# Patient Record
Sex: FEMALE | Race: WHITE | URBAN - METROPOLITAN AREA
[De-identification: names, ages, dates, MRNs, and addresses within clinical notes are randomized per-mention and may not be internally consistent; named-entity substitution may affect disease eponyms.]

---

## 2017-04-04 ENCOUNTER — EMERGENCY (EMERGENCY)
Facility: HOSPITAL | Age: 37
LOS: 1 days | Discharge: HOME | End: 2017-04-04
Attending: EMERGENCY MEDICINE | Admitting: EMERGENCY MEDICINE
Payer: COMMERCIAL

## 2017-04-04 VITALS
HEART RATE: 64 BPM | HEIGHT: 63 IN | RESPIRATION RATE: 20 BRPM | DIASTOLIC BLOOD PRESSURE: 81 MMHG | OXYGEN SATURATION: 100 % | SYSTOLIC BLOOD PRESSURE: 132 MMHG | TEMPERATURE: 98 F

## 2017-04-04 VITALS
SYSTOLIC BLOOD PRESSURE: 98 MMHG | RESPIRATION RATE: 20 BRPM | DIASTOLIC BLOOD PRESSURE: 62 MMHG | TEMPERATURE: 98 F | OXYGEN SATURATION: 98 % | HEART RATE: 92 BPM

## 2017-04-04 DIAGNOSIS — M54.6 PAIN IN THORACIC SPINE: ICD-10-CM

## 2017-04-04 DIAGNOSIS — F43.10 POST-TRAUMATIC STRESS DISORDER, UNSPECIFIED: ICD-10-CM

## 2017-04-04 DIAGNOSIS — F32.9 MAJOR DEPRESSIVE DISORDER, SINGLE EPISODE, UNSPECIFIED: ICD-10-CM

## 2017-04-04 DIAGNOSIS — R45.851 SUICIDAL IDEATIONS: ICD-10-CM

## 2017-04-04 PROCEDURE — 99284 EMERGENCY DEPT VISIT MOD MDM: CPT | Mod: 25

## 2017-04-04 PROCEDURE — 71046 X-RAY EXAM CHEST 2 VIEWS: CPT

## 2017-04-04 PROCEDURE — 71020: CPT | Mod: 26

## 2017-04-04 PROCEDURE — 99284 EMERGENCY DEPT VISIT MOD MDM: CPT

## 2017-04-04 NOTE — ED ADULT NURSE NOTE - OBJECTIVE STATEMENT
37 y/o s/w/f who presents self to ED for c/o low back pain for over 2 years now she verbalized s/i, but no plan or hx of suicide attempt, +hx of OCD, takes zoloft 50mg po bid, On asssessment, pt. denies any prior psychiatric admissions, no hx of drugs/ etoh.In, addition, pt.denies any s/hi, no a/v hallucinations or delusions of any kind.

## 2017-04-04 NOTE — ED ADULT NURSE NOTE - PMH
Depression    GERD (gastroesophageal reflux disease)    Iron deficiency anemia    MVP (mitral valve prolapse)

## 2017-04-04 NOTE — ED BEHAVIORAL HEALTH ASSESSMENT NOTE - HPI (INCLUDE ILLNESS QUALITY, SEVERITY, DURATION, TIMING, CONTEXT, MODIFYING FACTORS, ASSOCIATED SIGNS AND SYMPTOMS)
Pt is a 36 year old woman, domiciled with parents, no dependents, employed full time as , hx of MDD, no prior hospitalizations or suicide attempts, no hx of substance abuse, legal problems or aggression, BIB self for back pain.    However as pt was crying and appeared sad, psych was consulted.  Pt states that she has been going through a lot of stress.  For example her mom is getting a risky surgery.  She is financially supporting her parents and her brother.  She just broke up with her boyfriend today, which is the main trigger for her sadness.  However, she is not suicidal and does not feel hopeless. SHe states her family and nieces and nephews as reasons to live.  She is functioning fine, performing ADLs and performing at work and continues to work full time.  She sleeps well at night and has a good appetite.  She denies psychotic or manic symptoms, denies substance use.  She feels comfortable going home and following up with her psychiatrist outpatient.

## 2017-04-04 NOTE — ED BEHAVIORAL HEALTH ASSESSMENT NOTE - SUICIDE PROTECTIVE FACTORS
Engaged in work or school/Identifies reasons for living/Responsibility to family and others/Supportive social network or family/Future oriented

## 2017-04-04 NOTE — ED ADULT TRIAGE NOTE - CHIEF COMPLAINT QUOTE
back pain.  Pt denies any fall or injury.  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  Pain present x 1 year.  Today pain is worse.   Unknown amount of klonipin, robaxin, and xanax taken.  Pt states she is suicidal.

## 2017-04-04 NOTE — ED BEHAVIORAL HEALTH ASSESSMENT NOTE - SUMMARY
Pt is a 36 year old woman, domiciled with parents, no dependents, employed full time as , hx of MDD, no prior hospitalizations or suicide attempts, no hx of substance abuse, legal problems or aggression, BIB self for back pain.  Pt denies any suicidality.  Although sad, pt states she has been able to function and mood has been stable until today her boyfriend broke up with her.  However, she denies feeling hopeless, lists her family as reasons she wants to live and is future oriented about her job and has her brother and parents as financially dependent on her, which is a responsibility she takes seriously.  Furthermore, she has no hx of suicide attempts.  Pt does not require psychiatric hospitalization and pt feels safe regarding plan to be discharged home and to follow up with her outpatient psychiatrist.

## 2017-04-04 NOTE — ED BEHAVIORAL HEALTH ASSESSMENT NOTE - RISK ASSESSMENT
Pt has risk factors of psychosocial stressors, depression.  Pt has protective factors of social support, dependents (parents and brother are financially dependent), future orientation, denies suicidality, no substance use, employed.

## 2017-04-04 NOTE — ED PROVIDER NOTE - OBJECTIVE STATEMENT
36 y.o. female coming in with worsening mid thoracic back pain that she has had for over a year.  Normally responsive to NSAID which she took today with robaxin with some relief.  No saddle anesthesia, no numbness, no weakness, no problems using the bathroom, no dysuria, no frequency.  While in triage she bacme upset, began crying and yelling that she was going to kill herself.  No suicide attempts in the past.  History of PTSD according to the pt and follow up with Dr Iqbal at [facility].  No drug use today, no HI or hallucinations.

## 2017-04-04 NOTE — ED PROVIDER NOTE - MEDICAL DECISION MAKING DETAILS
Morteza: Patient yelling in moment expressing thoughts of killing herself. no plan. also c/o upper back pain.  will get psych eval, xray, reassess.

## 2017-07-07 ENCOUNTER — EMERGENCY (EMERGENCY)
Facility: HOSPITAL | Age: 37
LOS: 1 days | Discharge: HOME | End: 2017-07-07
Attending: EMERGENCY MEDICINE | Admitting: EMERGENCY MEDICINE
Payer: COMMERCIAL

## 2017-07-07 VITALS
DIASTOLIC BLOOD PRESSURE: 80 MMHG | RESPIRATION RATE: 18 BRPM | TEMPERATURE: 98 F | HEART RATE: 81 BPM | SYSTOLIC BLOOD PRESSURE: 120 MMHG | OXYGEN SATURATION: 100 %

## 2017-07-07 VITALS
OXYGEN SATURATION: 100 % | TEMPERATURE: 99 F | HEART RATE: 74 BPM | DIASTOLIC BLOOD PRESSURE: 71 MMHG | SYSTOLIC BLOOD PRESSURE: 110 MMHG | RESPIRATION RATE: 16 BRPM

## 2017-07-07 LAB
ALBUMIN SERPL ELPH-MCNC: 4.2 G/DL — SIGNIFICANT CHANGE UP (ref 3.3–5)
ALP SERPL-CCNC: 38 U/L — LOW (ref 40–120)
ALT FLD-CCNC: 10 U/L RC — SIGNIFICANT CHANGE UP (ref 10–45)
ANION GAP SERPL CALC-SCNC: 13 MMOL/L — SIGNIFICANT CHANGE UP (ref 5–17)
APPEARANCE UR: CLEAR — SIGNIFICANT CHANGE UP
AST SERPL-CCNC: 19 U/L — SIGNIFICANT CHANGE UP (ref 10–40)
BILIRUB SERPL-MCNC: 0.3 MG/DL — SIGNIFICANT CHANGE UP (ref 0.2–1.2)
BILIRUB UR-MCNC: ABNORMAL
BUN SERPL-MCNC: 9 MG/DL — SIGNIFICANT CHANGE UP (ref 7–23)
CALCIUM SERPL-MCNC: 9 MG/DL — SIGNIFICANT CHANGE UP (ref 8.4–10.5)
CHLORIDE SERPL-SCNC: 104 MMOL/L — SIGNIFICANT CHANGE UP (ref 96–108)
CO2 SERPL-SCNC: 23 MMOL/L — SIGNIFICANT CHANGE UP (ref 22–31)
COLOR SPEC: YELLOW — SIGNIFICANT CHANGE UP
CREAT SERPL-MCNC: 0.56 MG/DL — SIGNIFICANT CHANGE UP (ref 0.5–1.3)
DIFF PNL FLD: NEGATIVE — SIGNIFICANT CHANGE UP
GAS PNL BLDV: SIGNIFICANT CHANGE UP
GLUCOSE SERPL-MCNC: 78 MG/DL — SIGNIFICANT CHANGE UP (ref 70–99)
GLUCOSE UR QL: NEGATIVE — SIGNIFICANT CHANGE UP
HCG SERPL-ACNC: <2 MIU/ML — SIGNIFICANT CHANGE UP
HCT VFR BLD CALC: 26.9 % — LOW (ref 34.5–45)
HGB BLD-MCNC: 8.1 G/DL — LOW (ref 11.5–15.5)
KETONES UR-MCNC: ABNORMAL
LEUKOCYTE ESTERASE UR-ACNC: NEGATIVE — SIGNIFICANT CHANGE UP
MCHC RBC-ENTMCNC: 21.4 PG — LOW (ref 27–34)
MCHC RBC-ENTMCNC: 30.1 GM/DL — LOW (ref 32–36)
MCV RBC AUTO: 71 FL — LOW (ref 80–100)
NITRITE UR-MCNC: NEGATIVE — SIGNIFICANT CHANGE UP
PH UR: 6 — SIGNIFICANT CHANGE UP (ref 5–8)
PLATELET # BLD AUTO: 223 K/UL — SIGNIFICANT CHANGE UP (ref 150–400)
POTASSIUM SERPL-MCNC: 3.7 MMOL/L — SIGNIFICANT CHANGE UP (ref 3.5–5.3)
POTASSIUM SERPL-SCNC: 3.7 MMOL/L — SIGNIFICANT CHANGE UP (ref 3.5–5.3)
PROT SERPL-MCNC: 6.9 G/DL — SIGNIFICANT CHANGE UP (ref 6–8.3)
PROT UR-MCNC: 30 MG/DL
RBC # BLD: 3.79 M/UL — LOW (ref 3.8–5.2)
RBC # FLD: 17.5 % — HIGH (ref 10.3–14.5)
SODIUM SERPL-SCNC: 140 MMOL/L — SIGNIFICANT CHANGE UP (ref 135–145)
SP GR SPEC: >1.03 — HIGH (ref 1.01–1.02)
UROBILINOGEN FLD QL: 1
WBC # BLD: 5.8 K/UL — SIGNIFICANT CHANGE UP (ref 3.8–10.5)
WBC # FLD AUTO: 5.8 K/UL — SIGNIFICANT CHANGE UP (ref 3.8–10.5)

## 2017-07-07 PROCEDURE — 76830 TRANSVAGINAL US NON-OB: CPT

## 2017-07-07 PROCEDURE — 85014 HEMATOCRIT: CPT

## 2017-07-07 PROCEDURE — 76856 US EXAM PELVIC COMPLETE: CPT

## 2017-07-07 PROCEDURE — 84295 ASSAY OF SERUM SODIUM: CPT

## 2017-07-07 PROCEDURE — 83605 ASSAY OF LACTIC ACID: CPT

## 2017-07-07 PROCEDURE — 84702 CHORIONIC GONADOTROPIN TEST: CPT

## 2017-07-07 PROCEDURE — 93975 VASCULAR STUDY: CPT | Mod: 26

## 2017-07-07 PROCEDURE — 99285 EMERGENCY DEPT VISIT HI MDM: CPT

## 2017-07-07 PROCEDURE — 85027 COMPLETE CBC AUTOMATED: CPT

## 2017-07-07 PROCEDURE — 76856 US EXAM PELVIC COMPLETE: CPT | Mod: 26,59

## 2017-07-07 PROCEDURE — 81001 URINALYSIS AUTO W/SCOPE: CPT

## 2017-07-07 PROCEDURE — 76830 TRANSVAGINAL US NON-OB: CPT | Mod: 26

## 2017-07-07 PROCEDURE — 80053 COMPREHEN METABOLIC PANEL: CPT

## 2017-07-07 PROCEDURE — 84132 ASSAY OF SERUM POTASSIUM: CPT

## 2017-07-07 PROCEDURE — 93975 VASCULAR STUDY: CPT

## 2017-07-07 PROCEDURE — 82435 ASSAY OF BLOOD CHLORIDE: CPT

## 2017-07-07 PROCEDURE — 82330 ASSAY OF CALCIUM: CPT

## 2017-07-07 PROCEDURE — 82947 ASSAY GLUCOSE BLOOD QUANT: CPT

## 2017-07-07 PROCEDURE — 99284 EMERGENCY DEPT VISIT MOD MDM: CPT | Mod: 25

## 2017-07-07 PROCEDURE — 82803 BLOOD GASES ANY COMBINATION: CPT

## 2017-07-07 NOTE — ED ADULT NURSE NOTE - OBJECTIVE STATEMENT
36 yr old female with mother with c/o lower abd pain since last night, extensive hx fibroids, ruptured uterine cysts, +currently day 2 of menstrual cycle, +normal blood flow, dec appettite, (denies nausea/vomiting/fever), at present appears pale, vitals stable, afebrile, skin wdi 36 yr old female with mother with c/o lower abd pain since last night, extensive hx fibroids, ruptured ovarian cysts, +currently day 2 of menstrual cycle, +normal blood flow, dec appettite, (denies nausea/vomiting/fever), at present appears pale, vitals stable, afebrile, skin wdi, +abd pain feels similar to prior ruptured cyst

## 2017-07-07 NOTE — ED ADULT NURSE NOTE - ED STAT RN HANDOFF DETAILS
Report received from Lauren FAM. Patient A&Ox3, neuro intact, VSS. Reports pain but denies desire for pain medication. Awaiting US. Family at bedside. Call light placed next to patient. Safety Provided. Will continue to monitor.

## 2017-07-07 NOTE — ED PROVIDER NOTE - ATTENDING CONTRIBUTION TO CARE
I have seen and evaluated this patient with the resident.   I agree with the findings  unless other wise stated.  I have made appropriate changes in documentations where needed, After my face to face bedside evaluation, I am further  noting: Small complex free fluid in the right adnexa, may be seen in the setting of ruptured corpus luteum or hemorrhagic cyst. Will discharge pt home with iron supplement and to follow up with GYN.

## 2017-07-07 NOTE — ED PROVIDER NOTE - MEDICAL DECISION MAKING DETAILS
pt with lower abdominal pain, hx of ovarian cyst rupture, uterine fibroid. CBC shows microcytic anema. will check transvag US.  Bulky fibroid uterus with dominant 6 cm posterior body intramural fibroid.    Small complex free fluid in the right adnexa, may be seen in the setting of ruptured corpus luteum or hemorrhagic cyst. Will discharge pt home to follow up with GYN. pt with lower abdominal pain, hx of ovarian cyst rupture, uterine fibroid. CBC shows microcytic anemia. will check transvag US.  Bulky fibroid uterus with dominant 6 cm posterior body intramural fibroid.    Small complex free fluid in the right adnexa, may be seen in the setting of ruptured corpus luteum or hemorrhagic cyst. Will discharge pt home with iron supplement and to follow up with GYN. pt with lower abdominal pain, hx of ovarian cyst rupture, uterine fibroid. CBC shows microcytic anemia. will check transvag US. findings suggest large uterine fibroid causing pain pain control outpatient f/u -- Roque  Bulky fibroid uterus with dominant 6 cm posterior body intramural fibroid.    Small complex free fluid in the right adnexa, may be seen in the setting of ruptured corpus luteum or hemorrhagic cyst. Will discharge pt home with iron supplement and to follow up with GYN.

## 2017-07-07 NOTE — ED PROVIDER NOTE - OBJECTIVE STATEMENT
35 yo f pw abd pain. Pain started last night in the lower abd. Pain is constant, 8/10, sharp. some radiation to the back. She had hx of ruptured cyst.  a year ago. and hx of uterine fibroid. No diarrhea but endorses constipation. Normal BM yesterday. sexually active. uses protection.  no vaginal discharge, no nausea, vomiting. No recent changes in diet, no recent travel.  Currently on period but different from cramp.     Med: Zoloft and Klonopin.

## 2017-09-07 ENCOUNTER — EMERGENCY (EMERGENCY)
Facility: HOSPITAL | Age: 37
LOS: 1 days | Discharge: HOME | End: 2017-09-07
Attending: EMERGENCY MEDICINE | Admitting: EMERGENCY MEDICINE
Payer: COMMERCIAL

## 2017-09-07 VITALS
RESPIRATION RATE: 18 BRPM | TEMPERATURE: 98 F | DIASTOLIC BLOOD PRESSURE: 75 MMHG | SYSTOLIC BLOOD PRESSURE: 110 MMHG | OXYGEN SATURATION: 98 % | HEART RATE: 77 BPM

## 2017-09-07 LAB
ALBUMIN SERPL ELPH-MCNC: 4.4 G/DL — SIGNIFICANT CHANGE UP (ref 3.3–5)
ALP SERPL-CCNC: 44 U/L — SIGNIFICANT CHANGE UP (ref 40–120)
ALT FLD-CCNC: 11 U/L RC — SIGNIFICANT CHANGE UP (ref 10–45)
ANION GAP SERPL CALC-SCNC: 12 MMOL/L — SIGNIFICANT CHANGE UP (ref 5–17)
ANISOCYTOSIS BLD QL: SIGNIFICANT CHANGE UP
AST SERPL-CCNC: 15 U/L — SIGNIFICANT CHANGE UP (ref 10–40)
BASOPHILS # BLD AUTO: 0 K/UL — SIGNIFICANT CHANGE UP (ref 0–0.2)
BASOPHILS NFR BLD AUTO: 0.8 % — SIGNIFICANT CHANGE UP (ref 0–2)
BILIRUB SERPL-MCNC: 0.2 MG/DL — SIGNIFICANT CHANGE UP (ref 0.2–1.2)
BLD GP AB SCN SERPL QL: NEGATIVE — SIGNIFICANT CHANGE UP
BUN SERPL-MCNC: 8 MG/DL — SIGNIFICANT CHANGE UP (ref 7–23)
CALCIUM SERPL-MCNC: 9.4 MG/DL — SIGNIFICANT CHANGE UP (ref 8.4–10.5)
CHLORIDE SERPL-SCNC: 104 MMOL/L — SIGNIFICANT CHANGE UP (ref 96–108)
CO2 SERPL-SCNC: 26 MMOL/L — SIGNIFICANT CHANGE UP (ref 22–31)
CREAT SERPL-MCNC: 0.65 MG/DL — SIGNIFICANT CHANGE UP (ref 0.5–1.3)
ELLIPTOCYTES BLD QL SMEAR: SLIGHT — SIGNIFICANT CHANGE UP
EOSINOPHIL # BLD AUTO: 0.2 K/UL — SIGNIFICANT CHANGE UP (ref 0–0.5)
EOSINOPHIL NFR BLD AUTO: 3.6 % — SIGNIFICANT CHANGE UP (ref 0–6)
GLUCOSE SERPL-MCNC: 137 MG/DL — HIGH (ref 70–99)
HCT VFR BLD CALC: 24.4 % — LOW (ref 34.5–45)
HGB BLD-MCNC: 7.2 G/DL — LOW (ref 11.5–15.5)
HYPOCHROMIA BLD QL: SIGNIFICANT CHANGE UP
INR BLD: 1.09 RATIO — SIGNIFICANT CHANGE UP (ref 0.88–1.16)
LYMPHOCYTES # BLD AUTO: 1.8 K/UL — SIGNIFICANT CHANGE UP (ref 1–3.3)
LYMPHOCYTES # BLD AUTO: 31.9 % — SIGNIFICANT CHANGE UP (ref 13–44)
MACROCYTES BLD QL: SLIGHT — SIGNIFICANT CHANGE UP
MCHC RBC-ENTMCNC: 19.4 PG — LOW (ref 27–34)
MCHC RBC-ENTMCNC: 29.4 GM/DL — LOW (ref 32–36)
MCV RBC AUTO: 66 FL — LOW (ref 80–100)
MICROCYTES BLD QL: SIGNIFICANT CHANGE UP
MONOCYTES # BLD AUTO: 0.5 K/UL — SIGNIFICANT CHANGE UP (ref 0–0.9)
MONOCYTES NFR BLD AUTO: 9.4 % — SIGNIFICANT CHANGE UP (ref 2–14)
NEUTROPHILS # BLD AUTO: 3.1 K/UL — SIGNIFICANT CHANGE UP (ref 1.8–7.4)
NEUTROPHILS NFR BLD AUTO: 54.2 % — SIGNIFICANT CHANGE UP (ref 43–77)
OVALOCYTES BLD QL SMEAR: SLIGHT — SIGNIFICANT CHANGE UP
PLAT MORPH BLD: NORMAL — SIGNIFICANT CHANGE UP
PLATELET # BLD AUTO: 220 K/UL — SIGNIFICANT CHANGE UP (ref 150–400)
POIKILOCYTOSIS BLD QL AUTO: SLIGHT — SIGNIFICANT CHANGE UP
POTASSIUM SERPL-MCNC: 3.7 MMOL/L — SIGNIFICANT CHANGE UP (ref 3.5–5.3)
POTASSIUM SERPL-SCNC: 3.7 MMOL/L — SIGNIFICANT CHANGE UP (ref 3.5–5.3)
PROT SERPL-MCNC: 7.2 G/DL — SIGNIFICANT CHANGE UP (ref 6–8.3)
PROTHROM AB SERPL-ACNC: 11.8 SEC — SIGNIFICANT CHANGE UP (ref 9.8–12.7)
RBC # BLD: 3.7 M/UL — LOW (ref 3.8–5.2)
RBC # FLD: 18.8 % — HIGH (ref 10.3–14.5)
RBC BLD AUTO: ABNORMAL
RH IG SCN BLD-IMP: POSITIVE — SIGNIFICANT CHANGE UP
SODIUM SERPL-SCNC: 142 MMOL/L — SIGNIFICANT CHANGE UP (ref 135–145)
WBC # BLD: 5.8 K/UL — SIGNIFICANT CHANGE UP (ref 3.8–10.5)
WBC # FLD AUTO: 5.8 K/UL — SIGNIFICANT CHANGE UP (ref 3.8–10.5)

## 2017-09-07 PROCEDURE — 99220: CPT

## 2017-09-07 RX ORDER — ACETAMINOPHEN 500 MG
650 TABLET ORAL ONCE
Qty: 0 | Refills: 0 | Status: COMPLETED | OUTPATIENT
Start: 2017-09-07 | End: 2017-09-07

## 2017-09-07 RX ADMIN — Medication 650 MILLIGRAM(S): at 21:17

## 2017-09-07 NOTE — ED ADULT NURSE NOTE - NS ED NURSE DC INFO COMPLEXITY
Simple: Patient demonstrates quick and easy understanding/Returned Demonstration/Patient asked questions/Verbalized Understanding

## 2017-09-07 NOTE — ED CDU PROVIDER NOTE - PLAN OF CARE
1. Follow up with your doctor/ GYN for an outpt workup of your anemia.   2. Take copy of your results to your next appointment.  3. Rest. Keep self well hydrated. Dark green leafy vegetables. Iron supplements with glass of orange juice.   4. Return if weakness, chest pain, short of breath, dark stools, dizzy or any other concerns.

## 2017-09-07 NOTE — ED PROVIDER NOTE - NS ED ROS FT
CONSTITUTIONAL: no fevers  HEENT: no dysphagia  CV: no chest pain  RESP: no SOB  GI: no nausea/vomiting  : no dysuria  DERM: no rash  MSK: no back pain  NEURO: no LOC  ENDO: no diabetes

## 2017-09-07 NOTE — ED CDU PROVIDER NOTE - OBJECTIVE STATEMENT
36yof pmhx menorrhagia, iron deficiency anemia, last outpatient Hgb 7.2 1 week ago, here with generalized malaise, just finished her menses 2d ago, OB will be starting her on q 3 month birth control.  no LOC, no fevers, no dysuria. No chest pain or sob. Never had transfusion before. LMP 2 days ago. No active bleeding.

## 2017-09-07 NOTE — ED PROVIDER NOTE - PHYSICAL EXAMINATION
GEN: calm, cooperative, ENT: mucous membranes moist, HEAD: NCAT, CV: RRR, RESP: no respiratory distress, ABD: no abdominal TTP, MSK: moves all extremities, NEURO: awake, alert, oriented, PSYCH: affect normal

## 2017-09-07 NOTE — ED PROVIDER NOTE - OBJECTIVE STATEMENT
37 yo female, history of menorrhagia, iron deficiency anemia, last outpatient Hgb 7.2 1 week ago, here with generalized malaise, just finished her menses 2d ago, OB will be starting her on q 3 month birth control.  no LOC, no fevers, no dysuria.

## 2017-09-07 NOTE — ED CDU PROVIDER NOTE - PROGRESS NOTE DETAILS
Pt sleeping. NAD. No complaints. VSS. Continue to monitor. getting the 2nd unit of prbc -PA Sarah Zuniga Pt sleeping. NAD. No complaints. VSS. Continue to monitor. pending repeat cbc -PA Sarah Zuniga Patient seen at bedside in NAD.  VSS.  Patient resting comfortably without complaints. Repeat Hgb 9.6.  Patient instructed to continue iron supplementation and stool softeners.  Will follow up with her OBGYN next week. -Narinder Schrader PA-C ED attending Dr Kris Moore note:  Patient re-evaluated and doing well.  No acute issues at  this time.  Lab and radiology tests reviewed with patient and/or family.  Patient stable for discharge.  I have personally performed a face to face diagnostic evaluation on this patient.  I have reviewed the ACP note and agree with the history, exam, and plan of care, except as noted.  History and Exam by me show improvement of sts, received 2 units of prbc with repeat h/h 9.6/32, discussed with ob/gyn sent rx for ocp, fe and outpt follow up.

## 2017-09-07 NOTE — ED ADULT NURSE NOTE - OBJECTIVE STATEMENT
35 yo presents to the ED from urgent center. A&Ox4 c/o weakness for one week. pt reports that she is currently menstruating. pt reports ovarian cysts, pt reports severe pain during menstruation, was seen at ED last week for pain control and amount of blood loss, 10-15 saturated pads per day. pt states she went to urgent care center and they were concerned about low H&H. pt reports "always having low H&H". pt denies SOB, CP, dizziness, HA, N/V, fever. pt reports chills. pt denies previous blood transfusions. VSS. ambulating without difficulty. plan of care discussed. safety and comfort measures maintained.

## 2017-09-08 VITALS
SYSTOLIC BLOOD PRESSURE: 118 MMHG | HEART RATE: 74 BPM | TEMPERATURE: 99 F | RESPIRATION RATE: 18 BRPM | DIASTOLIC BLOOD PRESSURE: 66 MMHG | OXYGEN SATURATION: 100 %

## 2017-09-08 LAB
BASOPHILS # BLD AUTO: 0.1 K/UL — SIGNIFICANT CHANGE UP (ref 0–0.2)
BASOPHILS NFR BLD AUTO: 0.6 % — SIGNIFICANT CHANGE UP (ref 0–2)
EOSINOPHIL # BLD AUTO: 0.3 K/UL — SIGNIFICANT CHANGE UP (ref 0–0.5)
EOSINOPHIL NFR BLD AUTO: 3.3 % — SIGNIFICANT CHANGE UP (ref 0–6)
HCT VFR BLD CALC: 31.9 % — LOW (ref 34.5–45)
HGB BLD-MCNC: 9.6 G/DL — LOW (ref 11.5–15.5)
LYMPHOCYTES # BLD AUTO: 2 K/UL — SIGNIFICANT CHANGE UP (ref 1–3.3)
LYMPHOCYTES # BLD AUTO: 21.8 % — SIGNIFICANT CHANGE UP (ref 13–44)
MCHC RBC-ENTMCNC: 21.4 PG — LOW (ref 27–34)
MCHC RBC-ENTMCNC: 30.1 GM/DL — LOW (ref 32–36)
MCV RBC AUTO: 71.2 FL — LOW (ref 80–100)
MONOCYTES # BLD AUTO: 0.9 K/UL — SIGNIFICANT CHANGE UP (ref 0–0.9)
MONOCYTES NFR BLD AUTO: 9.9 % — SIGNIFICANT CHANGE UP (ref 2–14)
NEUTROPHILS # BLD AUTO: 5.8 K/UL — SIGNIFICANT CHANGE UP (ref 1.8–7.4)
NEUTROPHILS NFR BLD AUTO: 64.5 % — SIGNIFICANT CHANGE UP (ref 43–77)
PLAT MORPH BLD: NORMAL — SIGNIFICANT CHANGE UP
PLATELET # BLD AUTO: 279 K/UL — SIGNIFICANT CHANGE UP (ref 150–400)
RBC # BLD: 4.48 M/UL — SIGNIFICANT CHANGE UP (ref 3.8–5.2)
RBC # FLD: 20.6 % — HIGH (ref 10.3–14.5)
RBC BLD AUTO: SIGNIFICANT CHANGE UP
WBC # BLD: 9 K/UL — SIGNIFICANT CHANGE UP (ref 3.8–10.5)
WBC # FLD AUTO: 9 K/UL — SIGNIFICANT CHANGE UP (ref 3.8–10.5)

## 2017-09-08 PROCEDURE — 85027 COMPLETE CBC AUTOMATED: CPT

## 2017-09-08 PROCEDURE — 36430 TRANSFUSION BLD/BLD COMPNT: CPT

## 2017-09-08 PROCEDURE — 85610 PROTHROMBIN TIME: CPT

## 2017-09-08 PROCEDURE — 86901 BLOOD TYPING SEROLOGIC RH(D): CPT

## 2017-09-08 PROCEDURE — G0378: CPT

## 2017-09-08 PROCEDURE — 99217: CPT

## 2017-09-08 PROCEDURE — 99285 EMERGENCY DEPT VISIT HI MDM: CPT | Mod: 25

## 2017-09-08 PROCEDURE — 86900 BLOOD TYPING SEROLOGIC ABO: CPT

## 2017-09-08 PROCEDURE — P9016: CPT

## 2017-09-08 PROCEDURE — 80053 COMPREHEN METABOLIC PANEL: CPT

## 2017-09-08 PROCEDURE — 86850 RBC ANTIBODY SCREEN: CPT

## 2017-09-08 PROCEDURE — 86923 COMPATIBILITY TEST ELECTRIC: CPT

## 2017-09-08 NOTE — ED ADULT NURSE REASSESSMENT NOTE - NS ED NURSE REASSESS COMMENT FT1
#1 PRBC started as per order with 2RNs verifying the pt and blood product, consent in chart, pt is educated for signs and symptoms of adverse reaction.
Pt received from SARWAT Giang. Pt oriented to CDU & plan of care was discussed. Pt received receiving 2U PRBC. Pt aware of S&S of blood transfusion reaction and will notify RN or PA of symptoms. Pt states she is not having any dizziness, SOB, but feels slightly tired. Safety & comfort measures maintained. Call bell in reach. Will continue to monitor.
Pt states she feels better S/P blood transfusion  for possible discharge  & awaiting for CDU Consult

## 2017-11-10 NOTE — ED ADULT NURSE NOTE - CINV DISCH EXIT CARE INSTR PROVIDE
Please sign/send pending orders and notify pt iron may cause/worsen constipation, advise taking daily miralax (1 capful daily in an sugar-free beverage) while on iron   -repeat CBC in 2 weeks time to make sure he is making progress  -if unable to tolerate iron twice a day due to GI upset, can take once daily  -iron is best absorbed on an empty stomach when taken with Vitamin C tablet.   -can also supplement iron intake by cooking meals on cast iron    Thank you    yes

## 2018-01-08 ENCOUNTER — RESULT REVIEW (OUTPATIENT)
Age: 38
End: 2018-01-08

## 2019-06-21 ENCOUNTER — APPOINTMENT (OUTPATIENT)
Dept: ENDOCRINOLOGY | Facility: CLINIC | Age: 39
End: 2019-06-21

## 2019-10-25 NOTE — ED CLERICAL - DIVISION
Chief Complaint   Patient presents with   • Sinus Problem     Patient states runny nose and sinus itching . patient states green drainage , HA, pressure, congestion and chest tightning since Yesterday. Works at [employer] and been on lock down URI and pneumonia. Patient states she had flu shot already.         HISTORY OF PRESENT ILLNESS:   The patient is a 46 year old female who presents with complaints of cold, congestion, postnasal drip some chest tightening this started since yesterday denies any nausea vomiting chest pain shortness of breath wheezing is wondering if this has anything to do with pneumonia.  Patient denies any fever, chills, nausea, vomiting, shortness of breath, wheezing.  Has not taken anything over-the-counter.  Patient requesting a work note    PAST MEDICAL HISTORY, PAST SURGICAL HISTORY, SOCIAL HISTORY, MEDICATIONS, AND ALLERGIES:  Reviewed per electronic chart.    REVIEW OF SYSTEMS:  Constitutional:  Denies fever or chills   Eyes:  Denies change in visual acuity   Respiratory:  Per History of Present Illness   Cardiovascular:  Denies chest pain or edema   Gastrointestinal:  Denies abdominal pain, nausea, vomiting, bloody stools or diarrhea   Integument:  Denies rash       PHYSICAL EXAM:  Vitals:   Vitals:    10/25/19 0834   BP: (!) 151/110   Pulse: 76   Temp: 96.7 °F (35.9 °C)   TempSrc: Tympanic   SpO2: 98%   Weight: 104.7 kg   Height: 5' 3\" (1.6 m)   PainSc: 5-6      SpO2 Readings from Last 1 Encounters:   10/25/19 98%     Constitutional:  No acute distress, nontoxic appearance.  HENT: Normocephalic, atraumatic. Bilateral external ears normal. Oropharynx moist. No oral exudates. Nose inflamed Mucous membranes with swollen turbinates. Serous fluid present behind the bilateral tympanic membranes. No inflammation.  Eyes:  PERRLA (pupils equal, round, and reactive to light and accommodation), EOMI (extraocular movements are intact). Conjunctivae normal. No discharge.  Neck:  Normal range of motion. No  tenderness. Supple. No lymphadenopathy. No stridor.  Cardiovascular:  Normal heart rate. Normal rhythm. No murmurs. No rubs. No gallops.  Pulmonary/Chest:  Normal breath sounds. No respiratory distress. No wheezing. No chest tenderness .  Abdomen:  Bowel sounds normal. Soft. No tenderness. No masses. No pulsatile masses.    Lab/Radiology:  Orders Placed This Encounter   • cetirizine (ZYRTEC) 10 MG tablet   • Benzonatate (TESSALON PO)   • amoxicillin (AMOXIL) 875 MG tablet   • promethazine-dextromethorphan (PROMETHAZINE-DM) 6.25-15 MG/5ML syrup       ASSESSMENT:   Encounter Diagnoses   Name Primary?   • Acute URI Yes   • Bilateral acute serous otitis media, recurrence not specified        PLAN:  Symptomatic care reviewed amoxicillin for 10 days promethazine DM for cough I tried to explain to the patient this is all viral, patient insists on antibiotics.    Follow up with primary if no improvement of symptoms or worsening. Patient acknowledged understanding of the instructions.     [facility]...

## 2020-07-09 NOTE — ED BEHAVIORAL HEALTH ASSESSMENT NOTE - NS ED BHA MSE GENERAL APPEARANCE
+ Glucerna Shake 3 x daily =660 calories, 30 grams protein/consistent carbohydrate (evening snack)/dysphagia 2, mechanical soft, thin liquids/low sodium
Well developed

## 2025-03-24 ENCOUNTER — NON-APPOINTMENT (OUTPATIENT)
Age: 45
End: 2025-03-24

## 2025-05-09 ENCOUNTER — EMERGENCY (EMERGENCY)
Facility: HOSPITAL | Age: 45
LOS: 1 days | End: 2025-05-09
Attending: STUDENT IN AN ORGANIZED HEALTH CARE EDUCATION/TRAINING PROGRAM
Payer: SELF-PAY

## 2025-05-09 VITALS
RESPIRATION RATE: 14 BRPM | TEMPERATURE: 98 F | DIASTOLIC BLOOD PRESSURE: 71 MMHG | HEART RATE: 85 BPM | SYSTOLIC BLOOD PRESSURE: 106 MMHG | OXYGEN SATURATION: 97 %

## 2025-05-09 VITALS
TEMPERATURE: 98 F | DIASTOLIC BLOOD PRESSURE: 78 MMHG | HEART RATE: 108 BPM | WEIGHT: 139.99 LBS | RESPIRATION RATE: 18 BRPM | HEIGHT: 63 IN | SYSTOLIC BLOOD PRESSURE: 120 MMHG | OXYGEN SATURATION: 97 %

## 2025-05-09 LAB
ALBUMIN SERPL ELPH-MCNC: 4.4 G/DL — SIGNIFICANT CHANGE UP (ref 3.3–5)
ALP SERPL-CCNC: 59 U/L — SIGNIFICANT CHANGE UP (ref 40–120)
ALT FLD-CCNC: 22 U/L — SIGNIFICANT CHANGE UP (ref 10–45)
ANION GAP SERPL CALC-SCNC: 13 MMOL/L — SIGNIFICANT CHANGE UP (ref 5–17)
APTT BLD: 29.8 SEC — SIGNIFICANT CHANGE UP (ref 26.1–36.8)
AST SERPL-CCNC: 49 U/L — HIGH (ref 10–40)
BASOPHILS # BLD AUTO: 0.06 K/UL — SIGNIFICANT CHANGE UP (ref 0–0.2)
BASOPHILS NFR BLD AUTO: 0.8 % — SIGNIFICANT CHANGE UP (ref 0–2)
BILIRUB SERPL-MCNC: 0.2 MG/DL — SIGNIFICANT CHANGE UP (ref 0.2–1.2)
BUN SERPL-MCNC: 15 MG/DL — SIGNIFICANT CHANGE UP (ref 7–23)
CALCIUM SERPL-MCNC: 9.3 MG/DL — SIGNIFICANT CHANGE UP (ref 8.4–10.5)
CHLORIDE SERPL-SCNC: 102 MMOL/L — SIGNIFICANT CHANGE UP (ref 96–108)
CO2 SERPL-SCNC: 20 MMOL/L — LOW (ref 22–31)
CREAT SERPL-MCNC: 0.58 MG/DL — SIGNIFICANT CHANGE UP (ref 0.5–1.3)
EGFR: 115 ML/MIN/1.73M2 — SIGNIFICANT CHANGE UP
EGFR: 115 ML/MIN/1.73M2 — SIGNIFICANT CHANGE UP
EOSINOPHIL # BLD AUTO: 0.14 K/UL — SIGNIFICANT CHANGE UP (ref 0–0.5)
EOSINOPHIL NFR BLD AUTO: 1.9 % — SIGNIFICANT CHANGE UP (ref 0–6)
GLUCOSE SERPL-MCNC: 113 MG/DL — HIGH (ref 70–99)
HCT VFR BLD CALC: 40.4 % — SIGNIFICANT CHANGE UP (ref 34.5–45)
HGB BLD-MCNC: 13.2 G/DL — SIGNIFICANT CHANGE UP (ref 11.5–15.5)
IMM GRANULOCYTES NFR BLD AUTO: 0.3 % — SIGNIFICANT CHANGE UP (ref 0–0.9)
INR BLD: 0.92 RATIO — SIGNIFICANT CHANGE UP (ref 0.85–1.16)
LYMPHOCYTES # BLD AUTO: 2.88 K/UL — SIGNIFICANT CHANGE UP (ref 1–3.3)
LYMPHOCYTES # BLD AUTO: 39 % — SIGNIFICANT CHANGE UP (ref 13–44)
MCHC RBC-ENTMCNC: 29.7 PG — SIGNIFICANT CHANGE UP (ref 27–34)
MCHC RBC-ENTMCNC: 32.7 G/DL — SIGNIFICANT CHANGE UP (ref 32–36)
MCV RBC AUTO: 91 FL — SIGNIFICANT CHANGE UP (ref 80–100)
MONOCYTES # BLD AUTO: 0.72 K/UL — SIGNIFICANT CHANGE UP (ref 0–0.9)
MONOCYTES NFR BLD AUTO: 9.7 % — SIGNIFICANT CHANGE UP (ref 2–14)
NEUTROPHILS # BLD AUTO: 3.57 K/UL — SIGNIFICANT CHANGE UP (ref 1.8–7.4)
NEUTROPHILS NFR BLD AUTO: 48.3 % — SIGNIFICANT CHANGE UP (ref 43–77)
NRBC BLD AUTO-RTO: 0 /100 WBCS — SIGNIFICANT CHANGE UP (ref 0–0)
PLATELET # BLD AUTO: 225 K/UL — SIGNIFICANT CHANGE UP (ref 150–400)
POTASSIUM SERPL-MCNC: 5.1 MMOL/L — SIGNIFICANT CHANGE UP (ref 3.5–5.3)
POTASSIUM SERPL-SCNC: 5.1 MMOL/L — SIGNIFICANT CHANGE UP (ref 3.5–5.3)
PROT SERPL-MCNC: 7.6 G/DL — SIGNIFICANT CHANGE UP (ref 6–8.3)
PROTHROM AB SERPL-ACNC: 10.5 SEC — SIGNIFICANT CHANGE UP (ref 9.9–13.4)
RBC # BLD: 4.44 M/UL — SIGNIFICANT CHANGE UP (ref 3.8–5.2)
RBC # FLD: 12.1 % — SIGNIFICANT CHANGE UP (ref 10.3–14.5)
SODIUM SERPL-SCNC: 135 MMOL/L — SIGNIFICANT CHANGE UP (ref 135–145)
TROPONIN T, HIGH SENSITIVITY RESULT: <6 NG/L — SIGNIFICANT CHANGE UP (ref 0–51)
WBC # BLD: 7.39 K/UL — SIGNIFICANT CHANGE UP (ref 3.8–10.5)
WBC # FLD AUTO: 7.39 K/UL — SIGNIFICANT CHANGE UP (ref 3.8–10.5)

## 2025-05-09 PROCEDURE — 70498 CT ANGIOGRAPHY NECK: CPT | Mod: 26

## 2025-05-09 PROCEDURE — 80053 COMPREHEN METABOLIC PANEL: CPT

## 2025-05-09 PROCEDURE — 99053 MED SERV 10PM-8AM 24 HR FAC: CPT

## 2025-05-09 PROCEDURE — 70496 CT ANGIOGRAPHY HEAD: CPT

## 2025-05-09 PROCEDURE — 70450 CT HEAD/BRAIN W/O DYE: CPT

## 2025-05-09 PROCEDURE — 36000 PLACE NEEDLE IN VEIN: CPT | Mod: XU

## 2025-05-09 PROCEDURE — 99285 EMERGENCY DEPT VISIT HI MDM: CPT | Mod: 25

## 2025-05-09 PROCEDURE — 93010 ELECTROCARDIOGRAM REPORT: CPT

## 2025-05-09 PROCEDURE — 93005 ELECTROCARDIOGRAM TRACING: CPT

## 2025-05-09 PROCEDURE — 85610 PROTHROMBIN TIME: CPT

## 2025-05-09 PROCEDURE — 85025 COMPLETE CBC W/AUTO DIFF WBC: CPT

## 2025-05-09 PROCEDURE — 84484 ASSAY OF TROPONIN QUANT: CPT

## 2025-05-09 PROCEDURE — 70498 CT ANGIOGRAPHY NECK: CPT

## 2025-05-09 PROCEDURE — 70496 CT ANGIOGRAPHY HEAD: CPT | Mod: 26

## 2025-05-09 PROCEDURE — 82962 GLUCOSE BLOOD TEST: CPT

## 2025-05-09 PROCEDURE — 99285 EMERGENCY DEPT VISIT HI MDM: CPT

## 2025-05-09 PROCEDURE — 70450 CT HEAD/BRAIN W/O DYE: CPT | Mod: 26

## 2025-05-09 PROCEDURE — 85730 THROMBOPLASTIN TIME PARTIAL: CPT

## 2025-05-09 RX ORDER — SODIUM CHLORIDE 9 G/1000ML
1000 INJECTION, SOLUTION INTRAVENOUS ONCE
Refills: 0 | Status: COMPLETED | OUTPATIENT
Start: 2025-05-09 | End: 2025-05-09

## 2025-05-09 RX ADMIN — SODIUM CHLORIDE 1000 MILLILITER(S): 9 INJECTION, SOLUTION INTRAVENOUS at 01:07

## 2025-05-09 NOTE — ED PROVIDER NOTE - ATTENDING CONTRIBUTION TO CARE
44F called as a code stroke from waiting room, LKN 5/8/2025 11p for right facial droop/tightness w/o headache, weakness, vision changes, chest pain, shortness of breath, dizziness, nausea, vomiting, speech changes. Had botox 2 weeks prior. No prior hx of stroke.     Hemodynamically stable. NAD. NIHSS 1, Patient aaox4 to person, place, time, event. CNs intact w/ symm face except for right labial flattening, PERRL 3mm, EOMI w/o nystagmus, normal phonation. 5/5 str all 4 extrem w/ intact sensation to touch. Well coordinated. No drift. Steady gait. 2+ distal pulses, equal b/l. , well hydrated, RRR, no audible cardiac murmurs. clear lungs, no inc work of breathing. benign abdomen, - barcenas, no peritoneal signs, no cva ttp. no visible rashes nor deformities, no signs of jaundice, fluid overload, nor anemia. symm calves, no edema.    Right facial droop, NIHSS 1, no other neuro deficits, likely botox AE mediated, unlikely stroke, other central neuro process. Labs, CTA h/n, CTH, f/u neuro recs from code stroke activation from triage. Summary of presentation, physical exam findings, plan, expected turn around time for diagnostics discussed with patient. Agrees.

## 2025-05-09 NOTE — ED ADULT TRIAGE NOTE - HEART RATE (BEATS/MIN)
"Subjective:   Patient ID:  Tasha Cornejo is a 45 y.o. female who presents for evaluation of Palpitations (Echo and EKG done before)    PROBLEM LIST:  Breast cancer, right 10/23 (completed TH, now on trastuzumab-anns)    HPI: Cardio-oncology consultation  Referred today by Dr. Mcbride to establish care with Cardio-Oncology. She recently finished treatment with Taxol and Herceptin and is now receiving Kanjinti. Her EF and strain have been stable. Some regional wall motion abnormalities were reported on her last echo. She reports having palpitations about 6 weeks ago at work which were "intense." Since that time she has intermittent fluttering in her chest which occurs on a daily basis. She was given a prescription for propanolol by Oncology but hasn't taken it.    ECG 22-MAR-2024 09:51:26: Personally reviewed by me shows NSR 74 bpm    ECHO 3/24:  Summary  Show Result Comparison     Left Ventricle: The left ventricle is normal in size. Ventricular mass is normal. Normal wall thickness. Regional wall motion abnormalities present. See diagram for wall motion findings. There is low normal systolic function with a visually estimated ejection fraction of 50 - 55%. Ejection fraction by visual approximation is 55%. Biplane (2D) method of discs ejection fraction is 55%.GLS - 18.4. There is normal diastolic function.    Right Ventricle: Normal right ventricular cavity size. Wall thickness is normal. Right ventricle wall motion  is normal. Systolic function is normal.    Aortic Valve: There is mild aortic valve sclerosis.    IVC/SVC: Normal venous pressure at 3 mmHg.    Echo 11/23:  Summary  Show Result Comparison     Left Ventricle: The left ventricle is normal in size. Normal wall thickness. Normal wall motion. There is normal systolic function with a visually estimated ejection fraction of 60 - 65%. There is normal diastolic function.  Normal GLS at -20.6%.    Right Ventricle: Normal right ventricular cavity size. Wall " thickness is normal. Right ventricle wall motion  is normal. Systolic function is normal.    IVC/SVC: Normal venous pressure at 3 mmHg.    Past Medical History:   Diagnosis Date    Acid reflux     Chalazion     Hx of psychiatric care     Malignant neoplasm of lower-inner quadrant of right breast of female, estrogen receptor positive 2023    Therapy        Past Surgical History:   Procedure Laterality Date    BILATERAL MASTECTOMY Bilateral 2023    Procedure: MASTECTOMY, BILATERAL;  Surgeon: Tasha Mcleod MD;  Location: [facility];  Service: General;  Laterality: Bilateral;  3.5 HOURS / EMAIL SENT  @ 1:03 LK    COLONOSCOPY N/A 2023    Procedure: COLONOSCOPY;  Surgeon: Fabio Rice MD;  Location: [facility] ENDO (4TH FLR);  Service: Endoscopy;  Laterality: N/A;  pt confirmed earlier time  EB    ESOPHAGOGASTRODUODENOSCOPY N/A 2023    Procedure: EGD (ESOPHAGOGASTRODUODENOSCOPY);  Surgeon: Fabio Rice MD;  Location: [facility] (4TH FLR);  Service: Endoscopy;  Laterality: N/A;  Procedure Timin-4 weeks    referred by Dr. Rice/Prep instructions handed to patient and to portal.  Patient called did not answer- DB    ESOPHAGOGASTRODUODENOSCOPY N/A 10/18/2023    Procedure: EGD (ESOPHAGOGASTRODUODENOSCOPY);  Surgeon: Fabio Rice MD;  Location: [facility] (4TH FLR);  Service: Endoscopy;  Laterality: N/A;  ref Ray  timeframe 5-12 weeks   Dr. Rice patient  portal instruction and given to patient jm  10/11-precall complete-MS    INJECTION FOR SENTINEL NODE IDENTIFICATION Right 2023    Procedure: INJECTION, FOR SENTINEL NODE IDENTIFICATION;  Surgeon: Tasha Mcleod MD;  Location: [facility];  Service: General;  Laterality: Right;    INSERTION OF BREAST TISSUE EXPANDER Bilateral 2023    Procedure: INSERTION, TISSUE EXPANDER, BREAST;  Surgeon: Allen Damian MD;  Location: [facility];  Service: Plastics;  Laterality: Bilateral;  2 HOURS    INSERTION, CATHETER, TUNNELED Left 2023    Procedure:  INSERTION, CATHETER, TUNNELED;  Surgeon: Tasha Mcleod MD;  Location: [facility] OR;  Service: General;  Laterality: Left;    SENTINEL LYMPH NODE BIOPSY Right 12/14/2023    Procedure: BIOPSY, LYMPH NODE, SENTINEL;  Surgeon: Tasha Mcleod MD;  Location: [facility] OR;  Service: General;  Laterality: Right;       Social History     Socioeconomic History    Marital status:      Spouse name: Oseas   Occupational History    Occupation:    Tobacco Use    Smoking status: Never    Smokeless tobacco: Never   Substance and Sexual Activity    Alcohol use: Yes     Comment: rarely    Drug use: No    Sexual activity: Not Currently     Social Determinants of Health     Financial Resource Strain: Low Risk  (3/4/2024)    Overall Financial Resource Strain (CARDIA)     Difficulty of Paying Living Expenses: Not hard at all   Food Insecurity: No Food Insecurity (3/4/2024)    Hunger Vital Sign     Worried About Running Out of Food in the Last Year: Never true     Ran Out of Food in the Last Year: Never true   Transportation Needs: No Transportation Needs (3/4/2024)    PRAPARE - Transportation     Lack of Transportation (Medical): No     Lack of Transportation (Non-Medical): No   Physical Activity: Sufficiently Active (3/4/2024)    Exercise Vital Sign     Days of Exercise per Week: 4 days     Minutes of Exercise per Session: 60 min   Stress: Stress Concern Present (3/4/2024)    [employer] [place] [employer] - Occupational Stress Questionnaire     Feeling of Stress : To some extent   Social Connections: Unknown (3/4/2024)    Social Connection and Isolation Panel [NHANES]     Frequency of Communication with Friends and Family: Once a week     Frequency of Social Gatherings with Friends and Family: Never     Active Member of Clubs or Organizations: No     Attends Club or Organization Meetings: Patient declined     Marital Status:    Housing Stability: Low Risk  (3/4/2024)    Housing Stability Vital Sign      Unable to Pay for Housing in the Last Year: No     Number of Places Lived in the Last Year: 1     Unstable Housing in the Last Year: No       Family History   Problem Relation Name Age of Onset    Solid tumor Mother          uterine polyps    Other Mother          abnormal cells of cervix    Hypertension Father Fahad     Prostate cancer Father Fahad 67        tx: XRT seeds    Pancreatic cancer Paternal Uncle Vern 68        subtype unk; tx: unk    Cancer Maternal Grandmother Kasandra 68        mother thinks that a cancer dx was on death cert.    Cancer Maternal Grandfather Harry         esophageal (abn. cells at 54, tumor at 62)    Heart failure Paternal Grandmother      Albinism Paternal Grandmother      Alzheimer's disease Paternal Grandmother      Diabetes Paternal Grandfather      Heart failure Paternal Grandfather      Breast cancer Other Johanna         dx.late 50s or 60s (tx: unk)    Cancer Other Nichole         cervical or ovarian, dx.mid-60s (tx: unk)    Cancer Other E.J.         lung, dx.mid-60s    Cancer Other Luke III         lung, dx. early 60s    Solid tumor Other          stomach tumor (noncancerous)    Cataracts Neg Hx      Glaucoma Neg Hx      Macular degeneration Neg Hx      Colon cancer Neg Hx         Patient's Medications   New Prescriptions    No medications on file   Previous Medications    FLUTICASONE PROPIONATE (FLONASE) 50 MCG/ACTUATION NASAL SPRAY    1 spray by Each Nare route daily as needed for Rhinitis.    HYDROCORTISONE 2.5 % CREAM    Apply topically 2 (two) times daily.    LIDOCAINE-PRILOCAINE (EMLA) CREAM    Apply topically to port one hour prior to chemotherapy infusion    MULTIVITAMIN (THERAGRAN) PER TABLET    Take 1 tablet by mouth once daily.    MUPIROCIN (BACTROBAN) 2 % OINTMENT    Apply topically 3 (three) times daily.    OMEPRAZOLE (PRILOSEC) 40 MG CAPSULE    Take 1 capsule (40 mg total) by mouth every morning.    ONDANSETRON (ZOFRAN) 8 MG TABLET    Take 1 tablet (8 mg total) by  mouth every 8 (eight) hours as needed for Nausea.    PROCHLORPERAZINE (COMPAZINE) 5 MG TABLET    Take 2 tablets (10 mg total) by mouth every 6 (six) hours as needed for Nausea.    PROPRANOLOL (INDERAL) 20 MG TABLET    Take one tablet by mouth daily as needed for anxiety/panic attack   Modified Medications    No medications on file   Discontinued Medications    No medications on file       Review of Systems   Constitutional: Negative for malaise/fatigue and weight gain.   HENT:  Negative for hearing loss.    Eyes:  Negative for visual disturbance.   Cardiovascular:  Positive for palpitations. Negative for chest pain, claudication, dyspnea on exertion, leg swelling, near-syncope, orthopnea, paroxysmal nocturnal dyspnea and syncope.   Respiratory:  Negative for cough, shortness of breath, sleep disturbances due to breathing, snoring and wheezing.    Endocrine: Negative for cold intolerance, heat intolerance, polydipsia, polyphagia and polyuria.   Hematologic/Lymphatic: Negative for bleeding problem. Does not bruise/bleed easily.   Skin:  Negative for rash and suspicious lesions.   Musculoskeletal:  Negative for arthritis, falls, joint pain, muscle weakness and myalgias.   Gastrointestinal:  Negative for abdominal pain, change in bowel habit, constipation, diarrhea, heartburn, hematochezia, melena and nausea.   Genitourinary:  Negative for hematuria and nocturia.   Neurological:  Negative for excessive daytime sleepiness, dizziness, headaches, light-headedness, loss of balance and weakness.   Psychiatric/Behavioral:  Negative for depression. The patient is not nervous/anxious.    Allergic/Immunologic: Negative for environmental allergies.       /83   Pulse 77   Ht 5' (1.524 m)   Wt 51.2 kg (112 lb 14 oz)   SpO2 98%   BMI 22.04 kg/m²     Objective:   Physical Exam  Constitutional:       Appearance: She is well-developed.      Comments:      HENT:      Head: Normocephalic and atraumatic.   Eyes:      General:  No scleral icterus.     Conjunctiva/sclera: Conjunctivae normal.      Pupils: Pupils are equal, round, and reactive to light.   Neck:      Thyroid: No thyromegaly.      Vascular: No hepatojugular reflux or JVD.      Trachea: No tracheal deviation.   Cardiovascular:      Rate and Rhythm: Normal rate and regular rhythm.      Chest Wall: PMI is not displaced.      Pulses: Intact distal pulses.           Carotid pulses are 2+ on the right side and 2+ on the left side.       Radial pulses are 2+ on the right side and 2+ on the left side.        Dorsalis pedis pulses are 2+ on the right side and 2+ on the left side.        Posterior tibial pulses are 2+ on the right side and 2+ on the left side.      Heart sounds: Normal heart sounds.   Pulmonary:      Effort: Pulmonary effort is normal.      Breath sounds: Normal breath sounds.   Abdominal:      General: Bowel sounds are normal. There is no distension.      Palpations: Abdomen is soft. There is no mass.      Tenderness: There is no abdominal tenderness.   Musculoskeletal:         General: No tenderness.      Cervical back: Normal range of motion and neck supple.   Lymphadenopathy:      Cervical: No cervical adenopathy.   Skin:     General: Skin is warm and dry.      Findings: No erythema or rash.      Nails: There is no clubbing.   Neurological:      Mental Status: She is alert and oriented to person, place, and time.   Psychiatric:         Speech: Speech normal.         Behavior: Behavior normal.         Lab Results   Component Value Date     04/17/2024    K 4.7 04/17/2024     04/17/2024    CO2 24 04/17/2024    BUN 14 04/17/2024    CREATININE 0.8 04/17/2024    GLU 87 04/17/2024    HGBA1C 5.1 01/31/2024    AST 19 04/17/2024    ALT 14 04/17/2024    ALBUMIN 4.0 04/17/2024    PROT 6.8 04/17/2024    BILITOT 0.3 04/17/2024    WBC 4.55 04/17/2024    HGB 12.0 04/17/2024    HCT 37.8 04/17/2024    MCV 96 04/17/2024     04/17/2024    TSH 1.662 08/30/2023    CHOL  166 08/30/2023    HDL 52 08/30/2023    LDLCALC 105.4 08/30/2023    TRIG 43 08/30/2023       Assessment:     1. Malignant neoplasm of lower-inner quadrant of right breast of female, estrogen receptor positive : Completed Taxol and Herceptin, now on Kajinti. Serial echocardiograms are recommended every three months while receiving trastuzumab and 6 months following completion. Will check troponin level given regional wall motion abnormality reported. BP readings in [facility] are normal.   2. Palpitations : Cardiac exam and resting ecg are normal. 48 hour monitor ordered.       Plan:     Tasha was seen today for palpitations.    Diagnoses and all orders for this visit:    Malignant neoplasm of lower-inner quadrant of right breast of female, estrogen receptor positive  -     Ambulatory referral/consult to Cardiology  -     Troponin I; Future    Palpitations  -     Holter monitor - 48 hour; Future        Thank you for allowing me to participate in this patient's care. Please do not hesitate to contact me with any questions or concerns.           108

## 2025-05-09 NOTE — ED PROVIDER NOTE - EKG/XRAY ADDITIONAL INFORMATION
I, Dr. Nakul Flynn, independently interpreted the : ekg  interp at 1257a  sinus tachycardia with 1st degree av block,   rate 101 pr 238 qrs 88 qtc 453  No diagnostic ischemic ekg changes

## 2025-05-09 NOTE — ED PROVIDER NOTE - NSICDXPASTMEDICALHX_GEN_ALL_CORE_FT
PAST MEDICAL HISTORY:  Depression     GERD (gastroesophageal reflux disease)     Iron deficiency anemia     MVP (mitral valve prolapse)

## 2025-05-09 NOTE — CONSULT NOTE ADULT - SUBJECTIVE AND OBJECTIVE BOX
**STROKE CODE CONSULT NOTE**    -------------------------------------------------------------------------------------------------------------------------------------------------------------------------------------------------------------------------    HPI: 43yo F w PMH of mood disorder, comes in as a code stroke after noticing facial asymmetry about 1 hour prior to arrival. LKW 3898 5/7. Upon looking into the mirror around 0001, she felt her right angle of mouth is higher thena left. She had mild headache earlier in the evening but nothing since. She denies any blurry or double vision, no speech changes, no weakness, numbness, no dizziness. No hx of stroke, sz, headache. She did have cosmetic botox injections in her right cheek and mental region and mentioned it was more than usual dose, but it was about 2 weeks ago.      PAST MEDICAL & SURGICAL HISTORY:  Depression      MVP (mitral valve prolapse)      Iron deficiency anemia      GERD (gastroesophageal reflux disease)      No significant past surgical history          FAMILY HISTORY:  No pertinent family history in first degree relatives        SOCIAL HISTORY:  Smoking Cessation:    MEDICATIONS  (STANDING):    MEDICATIONS  (PRN):      Allergies    No Known Allergies    Intolerances        --------------------------------------------------------------------------------------------------------------------------------------------------------------------------------------------------------------------    Vital Signs Last 24 Hrs  T(C): 36.7 (09 May 2025 00:11), Max: 36.7 (09 May 2025 00:11)  T(F): 98 (09 May 2025 00:11), Max: 98 (09 May 2025 00:11)  HR: 108 (09 May 2025 00:11) (108 - 108)  BP: 120/78 (09 May 2025 00:11) (120/78 - 120/78)  BP(mean): --  RR: 18 (09 May 2025 00:11) (18 - 18)  SpO2: 97% (09 May 2025 00:11) (97% - 97%)    Parameters below as of 09 May 2025 00:11  Patient On (Oxygen Delivery Method): room air        Fingerstick Blood Glucose: CAPILLARY BLOOD GLUCOSE      POCT Blood Glucose.: 132 mg/dL (09 May 2025 00:12)      PHYSICAL EXAM:     General - NAD  Cardiovascular - Peripheral pulses palpable, no edema    NEURO:    Mental status - Awake, Alert, Oriented to person, place, and time. Speech fluent, repetition and naming intact. Follows simple and complex commands. Attention/concentration, and fund of knowledge intact.    Cranial nerves -   PERRL, VFF, EOMI,   face sensation (V1-V3) intact b/l,   facial strength asymmetric, right lower lip higher than left on smiling, she feels left is normal, suspect depressor muscle weakness.  hearing intact b/l,   palate with symmetric elevation,   trapezius 5/5 strength b/l,   tongue midline on protrusion with full lateral movement    Motor - Normal bulk and tone throughout. No pronator drift.  Strength testing            Deltoid      Biceps      Triceps     Wrist Extension    Wrist Flexion     Interossei         R            5                 5               5                     5                              5                        5                 5  L             5                 5               5                     5                              5                        5                 5              Hip Flexion    Hip Extension    Knee Flexion    Knee Extension    Dorsiflexion    Plantar Flexion  R              5                           5                       5                           5                            5                          5  L              5                           5                        5                           5                            5                          5    Sensation - Light touch AND/OR vibration intact throughout    DTR's -             Biceps      Triceps     Brachioradialis      Patellar    Ankle    Toes/plantar response  R             2+             2+                  2+                       2+            2+                 Down  L              2+             2+                 2+                        2+           2+                 Down    Coordination - Finger to Nose intact b/l. Heel to Grace intact b/l. No tremors appreciated    Gait and station - Normal casual gait. Romberg (-)    ---------------------------------------------------------------------------------------------------------------------------------------------------------------------------------------------------------------------------    Labs:                        13.2   7.39  )-----------( 225      ( 09 May 2025 00:25 )             40.4           CAPILLARY BLOOD GLUCOSE      POCT Blood Glucose.: 132 mg/dL (09 May 2025 00:12)        PT/INR - ( 09 May 2025 00:25 )   PT: 10.5 sec;   INR: 0.92 ratio         PTT - ( 09 May 2025 00:25 )  PTT:29.8 sec  CSF:                  Radiology:

## 2025-05-09 NOTE — ED PROVIDER NOTE - PROGRESS NOTE DETAILS
High PGY3 -Lab work grossly unremarkable, CTA head and neck without any acute findings.  Likely side effect of Botox injections.  Dispo to home with outpatient follow-up. Attending Nakul Flynn:  Case discussed with neurology resident on record, who verbally reported he discussed case with stroke fellow on record Dr. Hunter who is acting under the supervision of Dr. Vieira, regardless of their own personal evaluation or lack thereof of the patient, per institutional consult protocols. Per neuro resident, patient is cleared for discharge from a neurologic standpoint as suspicion for stroke is low and sxs favor sequale of botox. all was discussed with patient by ED team with strict return precautions discussed and verbal understanding expressed. Stable for dc.

## 2025-05-09 NOTE — ED ADULT TRIAGE NOTE - CHIEF COMPLAINT QUOTE
right sided facial droop onset 11pm while flossing, had tightness/HA to back of head at 730pm.  Fs 132 right sided facial droop onset 11pm while flossing, had tightness/HA to back of head at 730pm. denies vison changes/numbness/tingling  Fs 132

## 2025-05-09 NOTE — ED ADULT NURSE NOTE - CHIEF COMPLAINT QUOTE
right sided facial droop onset 11pm while flossing, had tightness/HA to back of head at 730pm. denies vison changes/numbness/tingling  Fs 132

## 2025-05-09 NOTE — CONSULT NOTE ADULT - ASSESSMENT
Assessment:  43yo F w PMH of mood disorder, comes in as a code stroke after noticing facial asymmetry about 1 hour prior to arrival. LKW 2330 5/7. Upon looking into the mirror around 0001, she felt her right angle of mouth is higher thena left. She had mild headache earlier in the evening but nothing since. She denies any blurry or double vision, no speech changes, no weakness, numbness, no dizziness. No hx of stroke, sz, headache. She did have cosmetic botox injections in her right cheek and mental region and mentioned it was more than usual dose, but it was about 2 weeks ago. Exam otherwise unremarkable, except for facial strength asymmetric, right lower lip higher than left on smiling, she feels left is normal, suspect depressor muscle weakness.    LKW 2330 5/7  MRS 0  NIHSS 1  less likely ischemic stroke, severity too mild, so not a tenecteplase candidate  not a thrombectomy candidate due to no LVO    Impression: right facial asymmetric, suspect oral muscle weakness after low botox injection. Low suspicion of stroke.    Recs:  INCOMPLETE  [] trial local cold compresses  [] no further neurovascular workup warranted at this point    Case to be discussed with stroke fellow Assessment:  45yo F w PMH of mood disorder, comes in as a code stroke after noticing facial asymmetry about 1 hour prior to arrival. LKW 2330 5/7. Upon looking into the mirror around 0001, she felt her right angle of mouth is higher thena left. She had mild headache earlier in the evening but nothing since. She denies any blurry or double vision, no speech changes, no weakness, numbness, no dizziness. No hx of stroke, sz, headache. She did have cosmetic botox injections in her right cheek and mental region and mentioned it was more than usual dose, but it was about 2 weeks ago. Exam otherwise unremarkable, except for facial strength asymmetric, right lower lip higher than left on smiling, she feels left is normal, suspect depressor muscle weakness.    LKW 2330 5/7  MRS 0  NIHSS 1  less likely ischemic stroke, severity too mild, so not a tenecteplase candidate  not a thrombectomy candidate due to no LVO    Impression: right facial asymmetric, suspect oral muscle weakness after low botox injection. Low suspicion of stroke.    Recs:  [] trial local cold compresses  [] no further neurovascular workup warranted at this point    Case to be discussed with stroke fellow Assessment:  43yo F w PMH of mood disorder, comes in as a code stroke after noticing facial asymmetry about 1 hour prior to arrival. LKW 2330 5/7. Upon looking into the mirror around 0001, she felt her right angle of mouth is higher thena left. She had mild headache earlier in the evening but nothing since. She denies any blurry or double vision, no speech changes, no weakness, numbness, no dizziness. No hx of stroke, sz, headache. She did have cosmetic botox injections in her right cheek and mental region and mentioned it was more than usual dose, but it was about 2 weeks ago. Exam otherwise unremarkable, except for facial strength asymmetric, right lower lip higher than left on smiling, she feels left is normal, suspect depressor muscle weakness.    LKW 2330 5/7  MRS 0  NIHSS 1  less likely ischemic stroke, severity too mild, so not a tenecteplase candidate  not a thrombectomy candidate due to no LVO    Impression: right facial asymmetric, suspect oral muscle weakness after low botox injection. Low suspicion of stroke.    Recs:  [] trial local cold compresses  [] no further neurovascular workup warranted at this point  [] of symptoms persist, patient could follow up with Dr. Vieira:  [address] [facility] [place] [address]. Call [phone].      Case to be discussed with stroke fellow Assessment:  43yo F w PMH of mood disorder, comes in as a code stroke after noticing facial asymmetry about 1 hour prior to arrival. LKW 2330 5/7. Upon looking into the mirror around 0001, she felt her right angle of mouth is higher thena left. She had mild headache earlier in the evening but nothing since. She denies any blurry or double vision, no speech changes, no weakness, numbness, no dizziness. No hx of stroke, sz, headache. She did have cosmetic botox injections in her right cheek and mental region and mentioned it was more than usual dose, but it was about 2 weeks ago. Exam otherwise unremarkable, except for facial strength asymmetric, right lower lip higher than left on smiling, she feels left is normal, suspect depressor muscle weakness.    LKW 2330 5/7  MRS 0  NIHSS 1  less likely ischemic stroke, severity too mild, so not a tenecteplase candidate  not a thrombectomy candidate due to no LVO    Impression: right facial asymmetric, suspect oral muscle weakness after low botox injection. Low suspicion of stroke.    Recs:  [] trial local cold compresses  [] no further neurovascular workup warranted at this point  [] of symptoms persist, patient could follow up with Dr. Vieira:  [address] [place] [address]. Call [phone].      Case to be discussed with stroke fellow Dr. Hunter and Dr. Pro Assessment:  43yo F w PMH of mood disorder, comes in as a code stroke after noticing facial asymmetry about 1 hour prior to arrival. LKW 2330 5/7. Upon looking into the mirror around 0001, she felt her right angle of mouth is higher thena left. She had mild headache earlier in the evening but nothing since. She denies any blurry or double vision, no speech changes, no weakness, numbness, no dizziness. No hx of stroke, sz, headache. She did have cosmetic botox injections in her right cheek and mental region and mentioned it was more than usual dose, but it was about 2 weeks ago. Exam otherwise unremarkable, except for facial strength asymmetric, right lower lip higher than left on smiling, she feels left is normal, suspect depressor muscle weakness.    LKW 2330 5/7  MRS 0  NIHSS 1  less likely ischemic stroke, severity too mild, so not a tenecteplase candidate  not a thrombectomy candidate due to no LVO    Impression: right facial asymmetric, suspect oral muscle weakness after low botox injection. Low suspicion of stroke.    Recs:  [] trial local cold compresses  [] no further neurovascular workup warranted at this point  [] of symptoms persist, patient could follow up with Dr. Vieira:  [address] [place] [address]. Call [phone].      Case to be discussed with stroke fellow Dr. Hunter and Dr. Morteza patterson to attending evaluation

## 2025-05-09 NOTE — ED PROVIDER NOTE - CARE PROVIDER_API CALL
Klever Vieira  Neurology  [address] [facility] [address] [facility], [address]  [place] [address]  Phone: [phone]  Fax: [phone]  Follow Up Time: 4-6 Days

## 2025-05-09 NOTE — ED PROVIDER NOTE - NSFOLLOWUPINSTRUCTIONS_ED_ALL_ED_FT
Today in the emergency department you were evaluated for your facial droop.  This is likely a side effect of your Botox.  Please follow-up with your Botox provider for any further recommendations. This side effect will likely wear off after several weeks.    Please follow up with your primary care physician within 1-2 weeks of discharge from the emergency department.  Please bring a copy of your results with you.  Please return to the emergency department for worsening of your symptoms.    You may take Acetaminophen over the counter as needed for pain and/or fever. Use as directed and see medication warnings.  You may take Ibuprofen over the counter as needed for pain and/or fever. Use as directed and see medication warnings.

## 2025-05-09 NOTE — ED ADULT NURSE NOTE - OBJECTIVE STATEMENT
43 y/o F A/Ox4  PMH.... presented to the ED via walk-in triage with complaints of asymmetrical smile. Patient states she was getting ready for bed when she noticed her smile was abnormal on the right ride. Upon assessment patient's right side of her mouth is overactive and herlinda more than her left side of her face. Patient states the last well known time was at about 22:30. Patient states she had a headache around 22:00 but the headache has now subsided. Code stroke activated upon patient arrival. Patient's respirations are even and unlabored, patient denies any chest pain or SOB. Patient is able to move all extremities evenly, neuro and sensation are intact. Safety measures in place, bed locked, lowered, and side rails raised.

## 2025-05-09 NOTE — ED PROVIDER NOTE - PATIENT PORTAL LINK FT
You can access the FollowMyHealth Patient Portal offered by [facility] by registering at the following website: [email or web address]. By joining [employer]’s FollowMyHealth portal, you will also be able to view your health information using other applications (apps) compatible with our system.